# Patient Record
Sex: MALE | Race: WHITE | HISPANIC OR LATINO | Employment: FULL TIME | URBAN - METROPOLITAN AREA
[De-identification: names, ages, dates, MRNs, and addresses within clinical notes are randomized per-mention and may not be internally consistent; named-entity substitution may affect disease eponyms.]

---

## 2019-10-02 ENCOUNTER — LAB VISIT (OUTPATIENT)
Dept: LAB | Facility: OTHER | Age: 34
End: 2019-10-02
Attending: NURSE PRACTITIONER
Payer: COMMERCIAL

## 2019-10-02 ENCOUNTER — OFFICE VISIT (OUTPATIENT)
Dept: NEUROLOGY | Facility: CLINIC | Age: 34
End: 2019-10-02
Payer: COMMERCIAL

## 2019-10-02 VITALS
HEART RATE: 111 BPM | HEIGHT: 68 IN | SYSTOLIC BLOOD PRESSURE: 143 MMHG | BODY MASS INDEX: 24.96 KG/M2 | DIASTOLIC BLOOD PRESSURE: 82 MMHG | WEIGHT: 164.69 LBS

## 2019-10-02 DIAGNOSIS — G44.89 CHRONIC MIXED HEADACHE SYNDROME: ICD-10-CM

## 2019-10-02 DIAGNOSIS — M79.2 NEURALGIA: Primary | ICD-10-CM

## 2019-10-02 LAB
25(OH)D3+25(OH)D2 SERPL-MCNC: 27 NG/ML (ref 30–96)
TSH SERPL DL<=0.005 MIU/L-ACNC: 0.88 UIU/ML (ref 0.4–4)

## 2019-10-02 PROCEDURE — 99204 PR OFFICE/OUTPT VISIT, NEW, LEVL IV, 45-59 MIN: ICD-10-PCS | Mod: S$GLB,,, | Performed by: NURSE PRACTITIONER

## 2019-10-02 PROCEDURE — 99999 PR PBB SHADOW E&M-NEW PATIENT-LVL III: ICD-10-PCS | Mod: PBBFAC,,, | Performed by: NURSE PRACTITIONER

## 2019-10-02 PROCEDURE — 99204 OFFICE O/P NEW MOD 45 MIN: CPT | Mod: S$GLB,,, | Performed by: NURSE PRACTITIONER

## 2019-10-02 PROCEDURE — 36415 COLL VENOUS BLD VENIPUNCTURE: CPT

## 2019-10-02 PROCEDURE — 3008F PR BODY MASS INDEX (BMI) DOCUMENTED: ICD-10-PCS | Mod: CPTII,S$GLB,, | Performed by: NURSE PRACTITIONER

## 2019-10-02 PROCEDURE — 82306 VITAMIN D 25 HYDROXY: CPT

## 2019-10-02 PROCEDURE — 99999 PR PBB SHADOW E&M-NEW PATIENT-LVL III: CPT | Mod: PBBFAC,,, | Performed by: NURSE PRACTITIONER

## 2019-10-02 PROCEDURE — 84443 ASSAY THYROID STIM HORMONE: CPT

## 2019-10-02 PROCEDURE — 3008F BODY MASS INDEX DOCD: CPT | Mod: CPTII,S$GLB,, | Performed by: NURSE PRACTITIONER

## 2019-10-02 NOTE — PROGRESS NOTES
"REFERRING PROVIDER: Self referred    REASON FOR CONSULT: Headaches     33/M with chronic headaches that began >1 yr ago (could be 1. 5yrs ago), localized dull-throbbing pain preauricular R side,mild intensity, that radiates to postauricular/occipital area R side. Entire pain last ~ 20min and doesn't recur.  Happens daily. It begins between 1-5pm. Can start while exercising also, but not consistent trigger. Denies teeth grinding or TMJ. He denies acute neurological or unilateral autonomic deficit, ringing in ears, imbalance. Once a month may have this pain 2x/day, maybe earlier in day ~ 11am. Pain is tolerable, never has to leave work or lie down or quiet/dark room. Denies nausea, vomiting, photo/phonophobia.     Had root canal R lower tooth 2-3yr ago and has "bad" tooth R side, saw DDS recently.   "might have chronic infection"    Hit head when was in a rolling thing at a pumpkin patch with his kid (~ 2017 fall)  Wears glasses just when on computer.   Intermittent episodes anxiety    FH: negative headaches  SH: , , 13yo, 6yo. 4yo sleeps with them      HIT-6 score= 52    ROS: Denies double vision. Otherwise a balance review of 10-systems is negative.       PHYSICAL EXAM:   General: W/D, W/N, well groomed  BP (!) 143/82 (BP Location: Right arm, Patient Position: Sitting, BP Method: Medium (Automatic))   Pulse (!) 111   Ht 5' 8" (1.727 m)   Wt 74.7 kg (164 lb 10.9 oz)   BMI 25.04 kg/m²   Neurological: Awake, alert, oriented x 3. Speech fluent, no dysarthria. Good attention span. Good fund of knowledge.   CN II-XII- pupils equal, no ptosis, nystagmus, EOM palsy. No facial asymmetry or facial sensory loss. Good hearing bilaterally. Good shoulder shrug, palatal elevation, tongue protrusion bilaterally.   Motor: 5/5 strength, normal tone/bulk in all extremities.   Sensory: Intact to light touch upper and lower extremities. No occipital or cervical paraspinal muscle tenderness.   Gait: Normal   Coordination: " Good FTNT, Heel to shin      IMPRESSION:   Temporoauricular and occipital neuralgia  Headache, unspecified chronic, potential AVM, aneurysm, structural lesion/acoustic neuroma, mass/tumor  Anxiety  Dental problem, needs extraction?    PLAN   Nerve block  Consider gabapentin 100mg -300mg qd-bid preventive  Check labs other etiological factors and MRI brain w/w/o (no hx head imagingAbortive therapy:       Encouraged to eat regular meals, get adequate sleep, avoid known triggers, and reduce stressors ensure good posture at work and ensure proper height of his computer at work.     RTC 2-mos, sooner if needed. Instructed how to keep HA diaries accurate monitoring

## 2019-10-07 ENCOUNTER — TELEPHONE (OUTPATIENT)
Dept: NEUROLOGY | Facility: CLINIC | Age: 34
End: 2019-10-07

## 2019-10-07 NOTE — TELEPHONE ENCOUNTER
----- Message from AMINA Monge sent at 10/3/2019 11:42 AM CDT -----  Regarding: FW: nerve block  No problem, we will get him scheduled as soon as possible.     Sari,   Could you please reach out to this patient to get him scheduled for a nerve block?   Thanks,  Dahlia     ----- Message -----  From: Krys Craig NP  Sent: 10/2/2019   4:52 PM CDT  To: AMINA Monge  Subject: nerve block                                      Would like for him to receive R ONB and R temporal-auricular block, has daily pain this area ongoing > 1yr, short lasting w/o other symptoms. Thx. Order is in.